# Patient Record
Sex: MALE | Race: WHITE | Employment: FULL TIME | URBAN - NONMETROPOLITAN AREA
[De-identification: names, ages, dates, MRNs, and addresses within clinical notes are randomized per-mention and may not be internally consistent; named-entity substitution may affect disease eponyms.]

---

## 2021-07-13 ENCOUNTER — OFFICE VISIT (OUTPATIENT)
Dept: PRIMARY CARE CLINIC | Age: 56
End: 2021-07-13

## 2021-07-13 VITALS
HEART RATE: 55 BPM | HEIGHT: 71 IN | RESPIRATION RATE: 20 BRPM | SYSTOLIC BLOOD PRESSURE: 128 MMHG | OXYGEN SATURATION: 99 % | WEIGHT: 210 LBS | TEMPERATURE: 98.2 F | DIASTOLIC BLOOD PRESSURE: 88 MMHG | BODY MASS INDEX: 29.4 KG/M2

## 2021-07-13 DIAGNOSIS — T14.8XXA PUNCTURE WOUND: ICD-10-CM

## 2021-07-13 DIAGNOSIS — L08.9 SKIN INFECTION: ICD-10-CM

## 2021-07-13 DIAGNOSIS — L03.114 CELLULITIS OF LEFT ELBOW: Primary | ICD-10-CM

## 2021-07-13 RX ORDER — DOXYCYCLINE HYCLATE 100 MG
100 TABLET ORAL 2 TIMES DAILY
Qty: 20 TABLET | Refills: 0 | Status: SHIPPED | OUTPATIENT
Start: 2021-07-13 | End: 2021-07-23

## 2021-07-13 RX ORDER — AMOXICILLIN AND CLAVULANATE POTASSIUM 875; 125 MG/1; MG/1
1 TABLET, FILM COATED ORAL 2 TIMES DAILY
Qty: 20 TABLET | Refills: 0 | Status: SHIPPED | OUTPATIENT
Start: 2021-07-13 | End: 2021-07-23

## 2021-07-13 ASSESSMENT — ENCOUNTER SYMPTOMS
RESPIRATORY NEGATIVE: 1
COLOR CHANGE: 1
NAUSEA: 0
VOMITING: 0
ABDOMINAL PAIN: 0
DIARRHEA: 0

## 2021-07-13 NOTE — PROGRESS NOTES
Lázaro Nelson [address] [facility] 595 [facility]  Dept: [phone]  Loc: [phone]    Leandra Mondragon (:  1965) is a 60 y.o. male,New patient, here for evaluation of the following chief complaint(s):  Joint Swelling (redness, swelling, cut open with knife at home)      ASSESSMENT/PLAN:  1. Cellulitis of left elbow  -     doxycycline hyclate (VIBRA-TABS) 100 MG tablet; Take 1 tablet by mouth 2 times daily for 10 days, Disp-20 tablet, R-0Normal  -     amoxicillin-clavulanate (AUGMENTIN) 875-125 MG per tablet; Take 1 tablet by mouth 2 times daily for 10 days, Disp-20 tablet, R-0Normal  -     XR ELBOW LEFT (MIN 3 VIEWS); Future  2. Skin infection  -     doxycycline hyclate (VIBRA-TABS) 100 MG tablet; Take 1 tablet by mouth 2 times daily for 10 days, Disp-20 tablet, R-0Normal  -     amoxicillin-clavulanate (AUGMENTIN) 875-125 MG per tablet; Take 1 tablet by mouth 2 times daily for 10 days, Disp-20 tablet, R-0Normal  -     XR ELBOW LEFT (MIN 3 VIEWS); Future  3. Puncture wound  -     doxycycline hyclate (VIBRA-TABS) 100 MG tablet; Take 1 tablet by mouth 2 times daily for 10 days, Disp-20 tablet, R-0Normal  -     amoxicillin-clavulanate (AUGMENTIN) 875-125 MG per tablet; Take 1 tablet by mouth 2 times daily for 10 days, Disp-20 tablet, R-0Normal  -     XR ELBOW LEFT (MIN 3 VIEWS); Future      Return in about 4 days (around 2021). AVS instructions for cellulitis and suspected skin abscess and suggested ER and obtaining imaging, which he declined at this time. Advised to Keep area covered clean and dry. Do not pick squeeze or attempt to drain the area on your own and to be vigilant   Monitor for any worsening of the redness, swelling and watch the measured area as we discussed. If you develop any fever, chills, pain in the joint or any worsening of he wound please follow up asap.    Take both ATB as directed watch for Gi upset or diarrhea, suggest yogurt of probiotics    SUBJECTIVE/OBJECTIVE:  Pt arrives with reports of an infected elbow. He reports the area is red, hot. He indicated a few days ago the area was swollen and  He thought it had fluid in it so  he used tip of scissors to poke the skin so that he could grain the fluid. He has since developed pain, redness, warmth and swelling to the skin of the elbow. Denies fever, chills ,nausea or any loss of sensation    Wound Check  He was originally treated yesterday (self initiated treatment). Prior ED Treatment: he used scissors to puncture the skin of his left elbow. There has been no drainage from the wound. There is new redness present. There is new swelling present. There is no pain present. He has no difficulty moving the affected extremity or digit. Review of Systems   Constitutional: Negative for chills, diaphoresis, fatigue and fever. HENT: Negative. Respiratory: Negative. Gastrointestinal: Negative for abdominal pain, diarrhea, nausea and vomiting. Genitourinary: Negative. Musculoskeletal: Positive for joint swelling. Negative for arthralgias. Skin: Positive for color change and wound. Neurological: Negative for dizziness, tremors, weakness and numbness. Hematological: Negative for adenopathy. Denies any paresthesia or severe pain of the joint, tissue of muscles of the arm. + Localized tenderness to the elbow soft tissue he rates as 4/10 currently and aching. Physical Exam  Vitals reviewed. Constitutional:       General: He is not in acute distress. Appearance: Normal appearance. He is not ill-appearing. HENT:      Head: Normocephalic. Eyes:      Extraocular Movements: Extraocular movements intact. Pupils: Pupils are equal, round, and reactive to light. Cardiovascular:      Rate and Rhythm: Normal rate and regular rhythm. Pulses: Normal pulses. Heart sounds: Normal heart sounds.    Pulmonary:      Effort: Pulmonary effort is normal.      Breath sounds: Normal breath sounds. Abdominal:      Palpations: Abdomen is soft. Musculoskeletal:         General: Swelling, tenderness and deformity present. Normal range of motion. Left upper arm: Normal.      Right elbow: Swelling present. Tenderness present. Arms:       Cervical back: Normal range of motion. Comments: Hot inflamed red elbow  Scabbing on olecranon  Tenderness with induration to the skin tissue of the left   Lymphadenopathy:      Cervical: No cervical adenopathy. Skin:     General: Skin is warm and dry. Capillary Refill: Capillary refill takes less than 2 seconds. Findings: Erythema and lesion present. Neurological:      Mental Status: He is alert and oriented to person, place, and time. Motor: Motor function is intact. Coordination: Romberg sign negative. Psychiatric:         Mood and Affect: Mood normal.         Behavior: Behavior normal.               Additional Information:    /88   Pulse 55   Temp 98.2 °F (36.8 °C)   Resp 20   Ht 5' 10.5\" (1.791 m)   Wt 210 lb (95.3 kg)   SpO2 99%   BMI 29.71 kg/m²     An electronic signature was used to authenticate this note.     --Homa Steinberg, APRN - CNP

## 2021-07-13 NOTE — PATIENT INSTRUCTIONS
Patient Education     Keep area covered clean and dry. Do not pick squeeze or attempt to drain the area on your own  Monitor for any worsening of the redness, swelling and watch the measured area as we discussed. If you develop any fever, chills, pain in the joint or any worsening of he wound please follow up asap. Take bot ATB as directed watch for Gi upset or diarrhea, suggest yogurt of probiotic    Stop by clinic daily for progress monitoring of the wound  See you Friday am 8:00 in the clinic for wound check prior to you leaving for [facility].     Cellulitis: Care Instructions  Your Care Instructions     Cellulitis is a skin infection caused by bacteria, most often strep or staph. It often occurs after a break in the skin from a scrape, cut, bite, or puncture, or after a rash. Cellulitis may be treated without doing tests to find out what caused it. But your doctor may do tests, if needed, to look for a specific bacteria, like methicillin-resistant Staphylococcus aureus (MRSA). The doctor has checked you carefully, but problems can develop later. If you notice any problems or new symptoms, get medical treatment right away. Follow-up care is a key part of your treatment and safety. Be sure to make and go to all appointments, and call your doctor if you are having problems. It's also a good idea to know your test results and keep a list of the medicines you take. How can you care for yourself at home? · Take your antibiotics as directed. Do not stop taking them just because you feel better. You need to take the full course of antibiotics. · Prop up the infected area on pillows to reduce pain and swelling. Try to keep the area above the level of your heart as often as you can. · If your doctor told you how to care for your wound, follow your doctor's instructions. If you did not get instructions, follow this general advice:  ? Wash the wound with clean water 2 times a day.  Don't use hydrogen peroxide or alcohol, which can slow healing. ? You may cover the wound with a thin layer of petroleum jelly, such as Vaseline, and a nonstick bandage. ? Apply more petroleum jelly and replace the bandage as needed. · Be safe with medicines. Take pain medicines exactly as directed. ? If the doctor gave you a prescription medicine for pain, take it as prescribed. ? If you are not taking a prescription pain medicine, ask your doctor if you can take an over-the-counter medicine. To prevent cellulitis in the future  · Try to prevent cuts, scrapes, or other injuries to your skin. Cellulitis most often occurs where there is a break in the skin. · If you get a scrape, cut, mild burn, or bite, wash the wound with clean water as soon as you can to help avoid infection. Don't use hydrogen peroxide or alcohol, which can slow healing. · If you have swelling in your legs (edema), support stockings and good skin care may help prevent leg sores and cellulitis. · Take care of your feet, especially if you have diabetes or other conditions that increase the risk of infection. Wear shoes and socks. Do not go barefoot. If you have athlete's foot or other skin problems on your feet, talk to your doctor about how to treat them. When should you call for help? Call your doctor now or seek immediate medical care if:    · You have signs that your infection is getting worse, such as:  ? Increased pain, swelling, warmth, or redness. ? Red streaks leading from the area. ? Pus draining from the area. ? A fever.     · You get a rash. Watch closely for changes in your health, and be sure to contact your doctor if:    · You do not get better as expected. Where can you learn more? Go to https://VB[employer] SystemspeBill-Ray Home Mobilityeb.Yakaz. org and sign in to your ClickingHouse account. Enter R259 in the FlagTap box to learn more about \"Cellulitis: Care Instructions. \"     If you do not have an account, please click on the \"Sign Up Now\" link.  Current as of: March 3, 2021               Content Version: 12.9  © 2006-2021 Healthwise, [employer]. Care instructions adapted under license by [facility] ([facility]). If you have questions about a medical condition or this instruction, always ask your healthcare professional. [address] any warranty or liability for your use of this information. Patient Education        Skin Abscess: Care Instructions  Your Care Instructions     A skin abscess is a bacterial infection that forms a pocket of pus. A boil is a kind of skin abscess. The doctor may have cut an opening in the abscess so that the pus can drain out. You may have gauze in the cut so that the abscess will stay open and keep draining. You may need antibiotics. You will need to follow up with your doctor to make sure the infection has gone away. The doctor has checked you carefully, but problems can develop later. If you notice any problems or new symptoms, get medical treatment right away. Follow-up care is a key part of your treatment and safety. Be sure to make and go to all appointments, and call your doctor if you are having problems. It's also a good idea to know your test results and keep a list of the medicines you take. How can you care for yourself at home? · Apply warm and dry compresses, a heating pad set on low, or a hot water bottle 3 or 4 times a day for pain. Keep a cloth between the heat source and your skin. · If your doctor prescribed antibiotics, take them as directed. Do not stop taking them just because you feel better. You need to take the full course of antibiotics. · Take pain medicines exactly as directed. ? If the doctor gave you a prescription medicine for pain, take it as prescribed. ? If you are not taking a prescription pain medicine, ask your doctor if you can take an over-the-counter medicine. · Keep your bandage clean and dry.  Change the bandage whenever it gets wet or dirty, or at least one time a day. · If the abscess was packed with gauze:  ? Keep follow-up appointments to have the gauze changed or removed. If the doctor instructed you to remove the gauze, follow the instructions you were given for how to remove it. ? After the gauze is removed, soak the area in warm water for 15 to 20 minutes 2 times a day, until the wound closes. When should you call for help? Call your doctor now or seek immediate medical care if:    · You have signs of worsening infection, such as:  ? Increased pain, swelling, warmth, or redness. ? Red streaks leading from the infected skin. ? Pus draining from the wound. ? A fever. Watch closely for changes in your health, and be sure to contact your doctor if:    · You do not get better as expected. Where can you learn more? Go to https://[employer] technologypeClearAccess.Synup. org and sign in to your Mercora account. Enter H803 in the iKONVERSE box to learn more about \"Skin Abscess: Care Instructions. \"     If you do not have an account, please click on the \"Sign Up Now\" link. Current as of: March 3, 2021               Content Version: 12.9  © 2006-2021 Healthwise, Incorporated. Care instructions adapted under license by [facility] ([facility]). If you have questions about a medical condition or this instruction, always ask your healthcare professional. [place] [address] any warranty or liability for your use of this information.

## 2021-07-16 ENCOUNTER — OFFICE VISIT (OUTPATIENT)
Dept: PRIMARY CARE CLINIC | Age: 56
End: 2021-07-16

## 2021-07-16 VITALS
RESPIRATION RATE: 20 BRPM | DIASTOLIC BLOOD PRESSURE: 80 MMHG | TEMPERATURE: 98.5 F | OXYGEN SATURATION: 97 % | HEIGHT: 71 IN | BODY MASS INDEX: 29.4 KG/M2 | WEIGHT: 210 LBS | SYSTOLIC BLOOD PRESSURE: 126 MMHG | HEART RATE: 66 BPM

## 2021-07-16 DIAGNOSIS — L03.114 CELLULITIS OF LEFT ELBOW: Primary | ICD-10-CM

## 2021-07-16 DIAGNOSIS — L08.9 SKIN INFECTION: ICD-10-CM

## 2021-07-16 ASSESSMENT — ENCOUNTER SYMPTOMS
VOMITING: 0
NAUSEA: 0
ABDOMINAL PAIN: 0
DIARRHEA: 0
COLOR CHANGE: 1
RESPIRATORY NEGATIVE: 1

## 2021-07-16 NOTE — PROGRESS NOTES
Lázaro Davispatel Nelson [id number] [facility] 595 [facility]  Dept: [phone]  Loc: [phone]    Lalo Villela (:  1965) is a 60 y.o. [facility] patient, here for evaluation of the following chief complaint(s): Wound Check (recheck cellulitis to  left elbow)      ASSESSMENT/PLAN:  1. Cellulitis of left elbow  2. Skin infection  improving; not completely resolved he still has oral ATb to complete anad reports he is having no SE of the mediction    No follow-ups on file. AVS instructions for cellulitis were provided at previous vistt. He declined needing new instructions today  Infection appears to be improving and he is not having any worsening of symptoms to suspect sepsis or systemic infection. Asked pt to stop in clinic next week to follow up after atb completed as there is some residual soft tissue welling of the olecranon, medial and lateral epicondyle regions. Advised to Keep area covered clean and dry. Do not pick squeeze or attempt to drain the area on your own and to be vigilant   Monitor for any worsening of the redness, swelling and watch the measured area as we discussed. If you develop any fever, chills, pain in the joint or any worsening of he wound please follow up asap. Take both ATB as directed watch for Gi upset or diarrhea, suggest yogurt of probiotics    SUBJECTIVE/OBJECTIVE:  Pt arrives for a follow up wound check for infected elbow. He reports the area is improving and not red, hot. he feels that the antibiotics are helping   Denies fever, chills ,nausea or any loss of sensation    Wound Check  He was originally treated 3 to 5 days ago (self initiated treatment). Prior ED Treatment: he used scissors to puncture the skin of his left elbow. There has been no drainage from the wound. The redness has improved. The swelling has improved. There is no pain present.  He has no difficulty moving the affected Status: He is alert and oriented to person, place, and time. Motor: Motor function is intact. Coordination: Romberg sign negative. Psychiatric:         Mood and Affect: Mood normal.         Behavior: Behavior normal.               Additional Information:    /80   Pulse 66   Temp 98.5 °F (36.9 °C)   Resp 20   Ht 5' 10.5\" (1.791 m)   Wt 210 lb (95.3 kg)   SpO2 97%   BMI 29.71 kg/m²     An electronic signature was used to authenticate this note.     --STACEY Harmon - CNP

## 2023-10-23 ENCOUNTER — OFFICE VISIT (OUTPATIENT)
Dept: PRIMARY CARE CLINIC | Age: 58
End: 2023-10-23

## 2023-10-23 VITALS
DIASTOLIC BLOOD PRESSURE: 70 MMHG | TEMPERATURE: 98.3 F | WEIGHT: 210 LBS | SYSTOLIC BLOOD PRESSURE: 120 MMHG | BODY MASS INDEX: 30.06 KG/M2 | RESPIRATION RATE: 16 BRPM | HEART RATE: 72 BPM | OXYGEN SATURATION: 99 % | HEIGHT: 70 IN

## 2023-10-23 DIAGNOSIS — J40 BRONCHITIS WITH ACUTE WHEEZING: Primary | ICD-10-CM

## 2023-10-23 RX ORDER — ALBUTEROL SULFATE 90 UG/1
2 AEROSOL, METERED RESPIRATORY (INHALATION) 4 TIMES DAILY PRN
Qty: 18 G | Refills: 0 | Status: SHIPPED | OUTPATIENT
Start: 2023-10-23

## 2023-10-23 RX ORDER — METHYLPREDNISOLONE 4 MG/1
TABLET ORAL
Qty: 21 KIT | Refills: 0 | Status: SHIPPED | OUTPATIENT
Start: 2023-10-23 | End: 2023-10-29

## 2023-10-23 RX ORDER — AZITHROMYCIN 250 MG/1
250 TABLET, FILM COATED ORAL SEE ADMIN INSTRUCTIONS
Qty: 6 TABLET | Refills: 0 | Status: SHIPPED | OUTPATIENT
Start: 2023-10-23 | End: 2023-10-28

## 2023-10-23 ASSESSMENT — ENCOUNTER SYMPTOMS
SORE THROAT: 1
COUGH: 1
RHINORRHEA: 0
SINUS PRESSURE: 0
COLOR CHANGE: 0
WHEEZING: 1
GASTROINTESTINAL NEGATIVE: 1
SINUS PAIN: 0

## 2023-10-23 NOTE — PATIENT INSTRUCTIONS
Acute bronchitis-non-smoker, take albuterol inhaler for cough /wheezing up to 4 x day ,drink plenty of fluids ,medrol dose pack tapering dose, antibiotic zpack as prescribed.  , may use sucrets for cough , should see improvement in 72 hrs if increased wheezing or SOB go to ER